# Patient Record
Sex: FEMALE | Race: WHITE | Employment: FULL TIME | URBAN - METROPOLITAN AREA
[De-identification: names, ages, dates, MRNs, and addresses within clinical notes are randomized per-mention and may not be internally consistent; named-entity substitution may affect disease eponyms.]

---

## 2019-03-22 ENCOUNTER — HOSPITAL ENCOUNTER (OUTPATIENT)
Dept: LAB | Age: 49
Discharge: HOME | End: 2019-03-22
Payer: COMMERCIAL

## 2019-03-22 ENCOUNTER — OFFICE VISIT (OUTPATIENT)
Dept: FAMILY MEDICINE CLINIC | Age: 49
End: 2019-03-22

## 2019-03-22 VITALS
HEART RATE: 88 BPM | SYSTOLIC BLOOD PRESSURE: 128 MMHG | WEIGHT: 173.8 LBS | HEIGHT: 65 IN | TEMPERATURE: 97.8 F | RESPIRATION RATE: 18 BRPM | BODY MASS INDEX: 28.96 KG/M2 | DIASTOLIC BLOOD PRESSURE: 66 MMHG | OXYGEN SATURATION: 97 %

## 2019-03-22 DIAGNOSIS — Z13.6 SCREENING FOR CARDIOVASCULAR CONDITION: ICD-10-CM

## 2019-03-22 DIAGNOSIS — N95.1 PERIMENOPAUSE: ICD-10-CM

## 2019-03-22 DIAGNOSIS — Z01.419 WELL WOMAN EXAM WITH ROUTINE GYNECOLOGICAL EXAM: Primary | ICD-10-CM

## 2019-03-22 DIAGNOSIS — Z12.11 COLON CANCER SCREENING: ICD-10-CM

## 2019-03-22 DIAGNOSIS — K59.09 CHRONIC CONSTIPATION: ICD-10-CM

## 2019-03-22 DIAGNOSIS — Z12.4 SCREENING FOR CERVICAL CANCER: ICD-10-CM

## 2019-03-22 DIAGNOSIS — Z12.39 SCREENING FOR BREAST CANCER: ICD-10-CM

## 2019-03-22 LAB
ALBUMIN SERPL-MCNC: 4.3 G/DL (ref 3.4–5)
ALBUMIN/GLOB SERPL: 1.3 {RATIO} (ref 0.8–1.7)
ALP SERPL-CCNC: 76 U/L (ref 45–117)
ALT SERPL-CCNC: 29 U/L (ref 13–56)
ANION GAP SERPL CALC-SCNC: 7 MMOL/L (ref 3–18)
AST SERPL-CCNC: 17 U/L (ref 15–37)
BILIRUB SERPL-MCNC: 0.5 MG/DL (ref 0.2–1)
BUN SERPL-MCNC: 15 MG/DL (ref 7–18)
BUN/CREAT SERPL: 14 (ref 12–20)
CALCIUM SERPL-MCNC: 8.9 MG/DL (ref 8.5–10.1)
CHLORIDE SERPL-SCNC: 104 MMOL/L (ref 100–108)
CHOLEST SERPL-MCNC: 183 MG/DL
CO2 SERPL-SCNC: 25 MMOL/L (ref 21–32)
CREAT SERPL-MCNC: 1.08 MG/DL (ref 0.6–1.3)
ERYTHROCYTE [DISTWIDTH] IN BLOOD BY AUTOMATED COUNT: 13 % (ref 11.6–14.5)
GLOBULIN SER CALC-MCNC: 3.3 G/DL (ref 2–4)
GLUCOSE SERPL-MCNC: 96 MG/DL (ref 74–99)
HCT VFR BLD AUTO: 43.1 % (ref 35–45)
HDLC SERPL-MCNC: 71 MG/DL (ref 40–60)
HDLC SERPL: 2.6 {RATIO} (ref 0–5)
HGB BLD-MCNC: 14.5 G/DL (ref 12–16)
LDLC SERPL CALC-MCNC: 97.2 MG/DL (ref 0–100)
LIPID PROFILE,FLP: ABNORMAL
MCH RBC QN AUTO: 30.8 PG (ref 24–34)
MCHC RBC AUTO-ENTMCNC: 33.6 G/DL (ref 31–37)
MCV RBC AUTO: 91.5 FL (ref 74–97)
PLATELET # BLD AUTO: 347 K/UL (ref 135–420)
PMV BLD AUTO: 10.5 FL (ref 9.2–11.8)
POTASSIUM SERPL-SCNC: 4.3 MMOL/L (ref 3.5–5.5)
PROT SERPL-MCNC: 7.6 G/DL (ref 6.4–8.2)
RBC # BLD AUTO: 4.71 M/UL (ref 4.2–5.3)
SODIUM SERPL-SCNC: 136 MMOL/L (ref 136–145)
TRIGL SERPL-MCNC: 74 MG/DL (ref ?–150)
VLDLC SERPL CALC-MCNC: 14.8 MG/DL
WBC # BLD AUTO: 8.7 K/UL (ref 4.6–13.2)

## 2019-03-22 PROCEDURE — 83001 ASSAY OF GONADOTROPIN (FSH): CPT

## 2019-03-22 PROCEDURE — 36415 COLL VENOUS BLD VENIPUNCTURE: CPT

## 2019-03-22 PROCEDURE — 80053 COMPREHEN METABOLIC PANEL: CPT

## 2019-03-22 PROCEDURE — 85027 COMPLETE CBC AUTOMATED: CPT

## 2019-03-22 PROCEDURE — 88142 CYTOPATH C/V THIN LAYER: CPT

## 2019-03-22 PROCEDURE — 80061 LIPID PANEL: CPT

## 2019-03-22 RX ORDER — CETIRIZINE HCL 10 MG
TABLET ORAL
COMMUNITY
End: 2019-05-28

## 2019-03-22 NOTE — PROGRESS NOTES
Chief Complaint   Patient presents with    Well Woman     1. Have you been to the ER, urgent care clinic since your last visit? Hospitalized since your last [facility] in the last three months with uti symptoms     2. Have you seen or consulted any other health care providers outside of the [address] [place] [address] since your last visit? Include any pap smears or colon screening. No     Subjective:   52 y.o. female for Well Woman Check. Patient's last menstrual period was 03/01/2019. Social History: single partner, contraception - vasectomy. Patient Active Problem List   Diagnosis Code    Intussusception ([facility] K56.1    Seasonal allergic rhinitis J30.2    Depression with anxiety F41.8    Chronic constipation K59.09    ADD (attention deficit disorder) F98.8     Patient Active Problem List    Diagnosis Date Noted    ADD (attention deficit disorder) 07/28/2015    Chronic constipation 06/23/2015    Depression with anxiety 10/01/2013    Seasonal allergic rhinitis 07/09/2011    [facility] 06/17/2011     Current Outpatient Medications   Medication Sig Dispense Refill    cetirizine (ZYRTEC) 10 mg tablet Take  by mouth.  linaclotide (LINZESS) 145 mcg cap capsule Take 1 Cap by mouth Daily (before breakfast). APPOINTMENT IS REQUIRED BEFORE NEXT REFILL. 30 Cap 2    FLUoxetine (PROZAC) 10 mg capsule TK 1 T PO QD  2    esomeprazole (NEXIUM) 20 mg capsule Take  by mouth daily.  omeprazole (PRILOSEC) 10 mg capsule Take 10 mg by mouth daily.  AMPHETAMINE SALT COMBO 30 mg tablet   0    LORATADINE/PSEUDOEPHEDRINE (CLARITIN-D 24 HOUR PO) Take  by mouth.  fexofenadine-pseudoephedrine (ALLEGRA-D 12 HOUR)  mg per tablet Take 1 Tab by mouth two (2) times a day.        No Known Allergies  Past Medical History:   Diagnosis Date    Anxiety     Chronic constipation 6/23/2015    Dr. Angelina Gutierrez [facility]    Constipation     Depression     GERD (gastroesophageal reflux disease)      Past Surgical History:   Procedure Laterality Date    HX  SECTION      HX LAPAROTOMY  0 and 5    HX TONSILLECTOMY  1982     Family History   Problem Relation Age of Onset    Depression Mother     Anxiety Mother     Heart Disease Mother     Hypertension Mother     Elevated Lipids Mother     Diabetes Maternal Grandmother     Cancer Paternal Grandmother         Pancreatic    Cancer Paternal Grandfather      Social History     Tobacco Use    Smoking status: Current Every Day Smoker     Packs/day: 0.50    Smokeless tobacco: Never Used    Tobacco comment: 3 pks per week   Substance Use Topics    Alcohol use: Yes     Alcohol/week: 0.0 oz     Comment: social         ROS:  Feeling well. No dyspnea or chest pain on exertion. No abdominal pain, change in bowel habits, black or bloody stools. No urinary tract symptoms. GYN ROS: normal menses, no abnormal bleeding, pelvic pain or discharge, no breast pain or new or enlarging lumps on self exam. No neurological complaints. Requesting refill for Linzess. Objective:     Visit Vitals  /66 (BP 1 Location: Left arm, BP Patient Position: Sitting)   Pulse 88   Temp 97.8 °F (36.6 °C) (Oral)   Resp 18   Ht 5' 5\" (1.651 m)   Wt 173 lb 12.8 oz (78.8 kg)   LMP 2019   SpO2 97%   BMI 28.92 kg/m²     The patient appears well, alert, oriented x 3, in no distress. ENT normal.  Neck supple. No adenopathy or thyromegaly. KRUNAL. Lungs are clear, good air entry, no wheezes, rhonchi or rales. S1 and S2 normal, no murmurs, regular rate and rhythm. Abdomen soft without tenderness, guarding, mass or organomegaly. Extremities show no edema, normal peripheral pulses. Neurological is normal, no focal findings.     BREAST EXAM: breasts appear normal, no suspicious masses, no skin or nipple changes or axillary nodes, risk and benefit of breast self-exam was discussed    PELVIC EXAM: normal external genitalia, vulva, vagina, cervix, uterus and adnexa, PAP: Pap smear done today, HPV test    Diagnoses and all orders for this visit:    Well woman exam with routine gynecological exam  Comments:  [V72.31]    Screening for breast cancer  -     KIARRA MAMMO BI SCREENING INCL CAD; Future    Screening for cervical cancer  -     PAP, LB, RFX HPV [id number]([id number]); Future    Colon cancer screening  -     REFERRAL TO GASTROENTEROLOGY    Chronic constipation  -     linaclotide (LINZESS) 145 mcg cap capsule; Take 1 Cap by mouth Daily (before breakfast). APPOINTMENT IS REQUIRED BEFORE NEXT REFILL. , Normal, Disp-30 Cap, R-2    Screening for cardiovascular condition  -     LIPID PANEL; Future  -     METABOLIC PANEL, COMPREHENSIVE; Future  -     CBC W/O DIFF; Future    Perimenopause  -     FSH AND LH; Future      Anticipatory guidance regarding health promotion for this age range and patient verbalizes understanding. Portion control and exercise healthy eating general guidelines reviewed with patient to get closer to normal BMI  Health maintenance reviewed  Patient agrees with plan and verbalizes understanding. I have discussed the diagnosis with the patient and the intended plan as seen in the above orders. The patient has received an after-visit summary and questions were answered concerning future plans. I have discussed medication side effects and warnings with the patient as well.     Follow-up and Dispositions    · Return in about 1 year (around 3/22/2020) for well female exam.

## 2019-03-22 NOTE — PATIENT INSTRUCTIONS
Well Visit, Ages 25 to 48: Care Instructions  Your Care Instructions    Physical exams can help you stay healthy. Your doctor has checked your overall health and may have suggested ways to take good care of yourself. He or she also may have recommended tests. At home, you can help prevent illness with healthy eating, regular exercise, and other steps. Follow-up care is a key part of your treatment and safety. Be sure to make and go to all appointments, and call your doctor if you are having problems. It's also a good idea to know your test results and keep a list of the medicines you take. How can you care for yourself at home? · Reach and stay at a healthy weight. This will lower your risk for many problems, such as obesity, diabetes, heart disease, and high blood pressure. · Get at least 30 minutes of physical activity on most days of the week. Walking is a good choice. You also may want to do other activities, such as running, swimming, cycling, or playing tennis or team sports. Discuss any changes in your exercise program with your doctor. · Do not smoke or allow others to smoke around you. If you need help quitting, talk to your doctor about stop-smoking programs and medicines. These can increase your chances of quitting for good. · Talk to your doctor about whether you have any risk factors for sexually transmitted infections (STIs). Having one sex partner (who does not have STIs and does not have sex with anyone else) is a good way to avoid these infections. · Use birth control if you do not want to have children at this time. Talk with your doctor about the choices available and what might be best for you. · Protect your skin from too much sun. When you're outdoors from 10 a.m. to 4 p.m., stay in the shade or cover up with clothing and a hat with a wide brim. Wear sunglasses that block UV rays. Even when it's cloudy, put broad-spectrum sunscreen (SPF 30 or higher) on any exposed skin.   · See a dentist one or two times a year for checkups and to have your teeth cleaned. · Wear a seat belt in the car. · Drink alcohol in moderation, if at all. That means no more than 2 drinks a day for men and 1 drink a day for women. Follow your doctor's advice about when to have certain tests. These tests can spot problems early. For everyone  · Cholesterol. Have the fat (cholesterol) in your blood tested after age 21. Your doctor will tell you how often to have this done based on your age, family history, or other things that can increase your risk for heart disease. · Blood pressure. Have your blood pressure checked during a routine doctor visit. Your doctor will tell you how often to check your blood pressure based on your age, your blood pressure results, and other factors. · Vision. Talk with your doctor about how often to have a glaucoma test.  · Diabetes. Ask your doctor whether you should have tests for diabetes. · Colon cancer. Have a test for colon cancer at age 48. You may have one of several tests. If you are younger than 48, you may need a test earlier if you have any risk factors. Risk factors include whether you already had a precancerous polyp removed from your colon or whether your parent, brother, sister, or child has had colon cancer. For women  · Breast exam and mammogram. Talk to your doctor about when you should have a clinical breast exam and a mammogram. Medical experts differ on whether and how often women under 50 should have these tests. Your doctor can help you decide what is right for you. · Pap test and pelvic exam. Begin Pap tests at age 24. A Pap test is the best way to find cervical cancer. The test often is part of a pelvic exam. Ask how often to have this test.  · Tests for sexually transmitted infections (STIs). Ask whether you should have tests for STIs. You may be at risk if you have sex with more than one person, especially if your partners do not wear condoms.   For men  · Tests for sexually transmitted infections (STIs). Ask whether you should have tests for STIs. You may be at risk if you have sex with more than one person, especially if you do not wear a condom. · Testicular cancer exam. Ask your doctor whether you should check your testicles regularly. · Prostate exam. Talk to your doctor about whether you should have a blood test (called a PSA test) for prostate cancer. Experts differ on whether and when men should have this test. Some experts suggest it if you are older than 39 and are -American or have a father or brother who got prostate cancer when he was younger than 72. When should you call for help? Watch closely for changes in your health, and be sure to contact your doctor if you have any problems or symptoms that concern you. Where can you learn more? Go to http://luciano-darlene.info/. Enter P072 in the search box to learn more about \"Well Visit, Ages 25 to 48: Care Instructions. \"  Current as of: March 28, 2018  Content Version: 11.9  © 9626-6504 [employer]. Care instructions adapted under license by [employer] (which disclaims liability or warranty for this information). If you have questions about a medical condition or this instruction, always ask your healthcare professional. [place] [address] any warranty or liability for your use of this information. Constipation: Care Instructions  Your Care Instructions    Constipation means that you have a hard time passing stools (bowel movements). People pass stools from 3 times a day to once every 3 days. What is normal for you may be different. Constipation may occur with pain in the rectum and cramping. The pain may get worse when you try to pass stools. Sometimes there are small amounts of bright red blood on toilet paper or the surface of stools. This is because of enlarged veins near the rectum (hemorrhoids).   A few changes in your diet and lifestyle may help you avoid ongoing constipation. Your doctor may also prescribe medicine to help loosen your stool. Some medicines can cause constipation. These include pain medicines and antidepressants. Tell your doctor about all the medicines you take. Your doctor may want to make a medicine change to ease your symptoms. Follow-up care is a key part of your treatment and safety. Be sure to make and go to all appointments, and call your doctor if you are having problems. It's also a good idea to know your test results and keep a list of the medicines you take. How can you care for yourself at home? · Drink plenty of fluids, enough so that your urine is light yellow or clear like water. If you have kidney, heart, or liver disease and have to limit fluids, talk with your doctor before you increase the amount of fluids you drink. · Include high-fiber foods in your diet each day. These include fruits, vegetables, beans, and whole grains. · Get at least 30 minutes of exercise on most days of the week. Walking is a good choice. You also may want to do other activities, such as running, swimming, cycling, or playing tennis or team sports. · Take a fiber supplement, such as Citrucel or Metamucil, every day. Read and follow all instructions on the label. · Schedule time each day for a bowel movement. A daily routine may help. Take your time having your bowel movement. · Support your feet with a small step stool when you sit on the toilet. This helps flex your hips and places your pelvis in a squatting position. · Your doctor may recommend an over-the-counter laxative to relieve your constipation. Examples are Milk of Magnesia and MiraLax. Read and follow all instructions on the label. Do not use laxatives on a long-term basis. When should you call for help? Call your doctor now or seek immediate medical care if:    · You have new or worse belly pain.     · You have new or worse nausea or vomiting.   · You have blood in your stools.    Watch closely for changes in your health, and be sure to contact your doctor if:    · Your constipation is getting worse.     · You do not get better as expected. Where can you learn more? Go to http://luciano-darlene.info/. Enter 21  in the search box to learn more about \"Constipation: Care Instructions. \"  Current as of: September 23, 2018  Content Version: 11.9  © 6897-3413 [employer]. Care instructions adapted under license by [employer] (which disclaims liability or warranty for this information). If you have questions about a medical condition or this instruction, always ask your healthcare professional. [address] any warranty or liability for your use of this information.

## 2019-03-25 LAB
FSH SERPL-ACNC: 4 MIU/ML
LH SERPL-ACNC: 6.2 MIU/ML

## 2019-04-01 ENCOUNTER — HOSPITAL ENCOUNTER (OUTPATIENT)
Dept: MAMMOGRAPHY | Age: 49
Discharge: HOME | End: 2019-04-01
Attending: NURSE PRACTITIONER
Payer: COMMERCIAL

## 2019-04-01 DIAGNOSIS — Z12.39 SCREENING FOR BREAST CANCER: ICD-10-CM

## 2019-04-01 PROCEDURE — 77063 BREAST TOMOSYNTHESIS BI: CPT

## 2019-04-24 DIAGNOSIS — T75.3XXA MOTION SICKNESS, INITIAL ENCOUNTER: Primary | ICD-10-CM

## 2019-04-24 RX ORDER — SCOLOPAMINE TRANSDERMAL SYSTEM 1 MG/1
1 PATCH, EXTENDED RELEASE TRANSDERMAL
Qty: 4 PATCH | Refills: 0 | Status: SHIPPED | OUTPATIENT
Start: 2019-04-24 | End: 2019-05-28 | Stop reason: ALTCHOICE

## 2019-05-28 ENCOUNTER — OFFICE VISIT (OUTPATIENT)
Dept: FAMILY MEDICINE CLINIC | Age: 49
End: 2019-05-28

## 2019-05-28 VITALS
HEIGHT: 65 IN | RESPIRATION RATE: 17 BRPM | WEIGHT: 176.4 LBS | BODY MASS INDEX: 29.39 KG/M2 | TEMPERATURE: 97.5 F | DIASTOLIC BLOOD PRESSURE: 72 MMHG | SYSTOLIC BLOOD PRESSURE: 134 MMHG | HEART RATE: 90 BPM | OXYGEN SATURATION: 98 %

## 2019-05-28 DIAGNOSIS — H92.02 LEFT EAR PAIN: ICD-10-CM

## 2019-05-28 DIAGNOSIS — H72.92 TYMPANIC MEMBRANE PERFORATION, LEFT: ICD-10-CM

## 2019-05-28 DIAGNOSIS — J30.1 ALLERGIC RHINITIS DUE TO POLLEN, UNSPECIFIED SEASONALITY: ICD-10-CM

## 2019-05-28 DIAGNOSIS — H65.92 LEFT SEROUS OTITIS MEDIA, UNSPECIFIED CHRONICITY: Primary | ICD-10-CM

## 2019-05-28 RX ORDER — LEVOCETIRIZINE DIHYDROCHLORIDE 5 MG/1
5 TABLET, FILM COATED ORAL
Qty: 30 TAB | Refills: 2 | Status: SHIPPED | OUTPATIENT
Start: 2019-05-28

## 2019-05-28 RX ORDER — IBUPROFEN 800 MG/1
800 TABLET ORAL
Qty: 60 TAB | Refills: 0 | Status: SHIPPED | OUTPATIENT
Start: 2019-05-28

## 2019-05-28 RX ORDER — AZITHROMYCIN 250 MG/1
TABLET, FILM COATED ORAL
Qty: 6 TAB | Refills: 0 | Status: SHIPPED | OUTPATIENT
Start: 2019-05-28 | End: 2019-06-02

## 2019-05-28 NOTE — PROGRESS NOTES
HISTORY OF PRESENT ILLNESS    Alma Sanchez is a 52y.o. year old female here today for left ear pain. [facility] and told that she had left otitis media and perforation. Given Amoxil which she finished. She is complaining of left ear pain today moderate to severe sharp and dull aching alternately. Has been taking loratadine and zyrtec and fexofenadine  for nasal congestion without relief of symptoms. Review of Systems   Constitutional: Negative for chills and fever. HENT: Positive for congestion. Negative for ear discharge, nosebleeds, sinus pain, sore throat and tinnitus. Muffled hearing on left   Respiratory: Negative. Gastrointestinal: Negative for nausea. +nausea associated with dizziness and lightheadedness when seen at [facility]      Skin: Negative. Neurological: Negative for dizziness and headaches. Current Outpatient Medications   Medication Sig Dispense Refill    azithromycin (ZITHROMAX) 250 mg tablet Take 2 tablets today, then take 1 tablet daily 6 Tab 0    ibuprofen (MOTRIN) 800 mg tablet Take 1 Tab by mouth every eight (8) hours as needed for Pain. 60 Tab 0    levocetirizine (XYZAL) 5 mg tablet Take 1 Tab by mouth daily as needed for Allergies. 30 Tab 2    linaclotide (LINZESS) 145 mcg cap capsule Take 1 Cap by mouth Daily (before breakfast). APPOINTMENT IS REQUIRED BEFORE NEXT REFILL. 30 Cap 2    omeprazole (PRILOSEC) 10 mg capsule Take 10 mg by mouth daily.  AMPHETAMINE SALT COMBO 30 mg tablet   0    FLUoxetine (PROZAC) 10 mg capsule TK 1 T PO QD  2    esomeprazole (NEXIUM) 20 mg capsule Take  by mouth daily.  fexofenadine-pseudoephedrine (ALLEGRA-D 12 HOUR)  mg per tablet Take 1 Tab by mouth two (2) times a day.        Patient Active Problem List   Diagnosis Code    [facility] K56.[facility]    Seasonal allergic rhinitis J30.2    Depression with anxiety F41.8    Chronic constipation K59.09    ADD (attention deficit disorder) F98.8     Reviewed past medical, family and social history  OBJECTIVE:  Awake and alert in no acute distress  HEENT: nares patent with erythema, boggy, clear secretions bilaterally. Left TM with effusion and mild erythema, +perforation no drainage with post auricular lymphadenopathy, right TM without erythema scar tissues visible,  pharynx without erythema, neck supple with shotty lymphadenopathy. No TTP over sinus passages bilaterallyl  Lungs clear throughout  S1 S2 RRR without ectopy or murmur auscultated. Integumentary: no rashes  Normal skin turgor  Visit Vitals  /72   Pulse 90   Temp 97.5 °F (36.4 °C) (Oral)   Resp 17   Ht 5' 5\" (1.651 m)   Wt 176 lb 6.4 oz (80 kg)   SpO2 98%   BMI 29.35 kg/m²     Diagnoses and all orders for this visit:    Left serous otitis media, unspecified chronicity  -     azithromycin (ZITHROMAX) 250 mg tablet; Take 2 tablets today, then take 1 tablet daily, Normal, Disp-6 Tab, R-0  -     ibuprofen (MOTRIN) 800 mg tablet; Take 1 Tab by mouth every eight (8) hours as needed for Pain., Normal, Disp-60 Tab, R-0  -     REFERRAL TO ENT-OTOLARYNGOLOGY    Left ear pain  -     ibuprofen (MOTRIN) 800 mg tablet; Take 1 Tab by mouth every eight (8) hours as needed for Pain., Normal, Disp-60 Tab, R-0  -     REFERRAL TO ENT-OTOLARYNGOLOGY    Tympanic membrane perforation, left  -     ibuprofen (MOTRIN) 800 mg tablet; Take 1 Tab by mouth every eight (8) hours as needed for Pain., Normal, Disp-60 Tab, R-0  -     REFERRAL TO ENT-OTOLARYNGOLOGY    Allergic rhinitis due to pollen, unspecified seasonality  -     levocetirizine (XYZAL) 5 mg tablet; Take 1 Tab by mouth daily as needed for Allergies. , Normal, Disp-30 Tab, R-2  -     REFERRAL TO ENT-OTOLARYNGOLOGY      Reviewed risks and benefits and common side effects of new medication  General comfort measures  Patient agrees with plan and verbalizes understanding.   I have discussed the diagnosis with the patient and the intended plan as seen in the above orders. The patient has received an after-visit summary and questions were answered concerning future plans. I have discussed medication side effects and warnings with the patient as well. Follow-up and Dispositions    · Return if symptoms worsen or fail to improve.

## 2019-05-28 NOTE — PROGRESS NOTES
Chief Complaint   Patient presents with    Ear Pain     left sided     1. Have you been to the ER, urgent care clinic since your last visit? Hospitalized since your last visit? [facility]      2. Have you seen or consulted any other health care providers outside of the [address] [place] [address] since your last visit? Include any pap smears or colon screening.   No

## 2019-05-29 RX ORDER — LEVOCETIRIZINE DIHYDROCHLORIDE 5 MG/1
TABLET, FILM COATED ORAL
Qty: 90 TAB | Refills: 2 | OUTPATIENT
Start: 2019-05-29

## 2020-03-09 ENCOUNTER — OFFICE VISIT (OUTPATIENT)
Dept: FAMILY MEDICINE CLINIC | Age: 50
End: 2020-03-09

## 2020-03-09 ENCOUNTER — HOSPITAL ENCOUNTER (OUTPATIENT)
Dept: LAB | Age: 50
Discharge: HOME | End: 2020-03-09
Payer: COMMERCIAL

## 2020-03-09 VITALS
BODY MASS INDEX: 28.99 KG/M2 | HEART RATE: 93 BPM | WEIGHT: 174 LBS | RESPIRATION RATE: 20 BRPM | DIASTOLIC BLOOD PRESSURE: 79 MMHG | TEMPERATURE: 97.8 F | HEIGHT: 65 IN | SYSTOLIC BLOOD PRESSURE: 145 MMHG | OXYGEN SATURATION: 98 %

## 2020-03-09 DIAGNOSIS — J01.90 ACUTE SINUSITIS, RECURRENCE NOT SPECIFIED, UNSPECIFIED LOCATION: ICD-10-CM

## 2020-03-09 DIAGNOSIS — Z87.898 HISTORY OF MOTION SICKNESS: ICD-10-CM

## 2020-03-09 DIAGNOSIS — N30.01 ACUTE CYSTITIS WITH HEMATURIA: ICD-10-CM

## 2020-03-09 DIAGNOSIS — L75.0 URINARY BODY ODOR: ICD-10-CM

## 2020-03-09 DIAGNOSIS — R82.90 MALODOROUS URINE: ICD-10-CM

## 2020-03-09 DIAGNOSIS — N30.01 ACUTE CYSTITIS WITH HEMATURIA: Primary | ICD-10-CM

## 2020-03-09 LAB
BILIRUB UR QL STRIP: NEGATIVE
GLUCOSE UR-MCNC: NEGATIVE MG/DL
KETONES P FAST UR STRIP-MCNC: NEGATIVE MG/DL
PH UR STRIP: 6 [PH] (ref 4.6–8)
PROT UR QL STRIP: NEGATIVE
SP GR UR STRIP: 1.03 (ref 1–1.03)
UA UROBILINOGEN AMB POC: NORMAL (ref 0.2–1)
URINALYSIS CLARITY POC: CLEAR
URINALYSIS COLOR POC: YELLOW
URINE BLOOD POC: NORMAL
URINE LEUKOCYTES POC: NORMAL
URINE NITRITES POC: NEGATIVE

## 2020-03-09 PROCEDURE — 87086 URINE CULTURE/COLONY COUNT: CPT

## 2020-03-09 RX ORDER — SCOLOPAMINE TRANSDERMAL SYSTEM 1 MG/1
1 PATCH, EXTENDED RELEASE TRANSDERMAL
Qty: 4 PATCH | Refills: 0 | Status: SHIPPED | OUTPATIENT
Start: 2020-03-09

## 2020-03-09 RX ORDER — SULFAMETHOXAZOLE AND TRIMETHOPRIM 800; 160 MG/1; MG/1
1 TABLET ORAL 2 TIMES DAILY
Qty: 20 TAB | Refills: 0 | Status: SHIPPED | OUTPATIENT
Start: 2020-03-09 | End: 2020-03-19

## 2020-03-09 NOTE — PATIENT INSTRUCTIONS
Urinary Tract Infection in Women: Care Instructions Your Care Instructions A urinary tract infection, or UTI, is a general term for an infection anywhere between the kidneys and the urethra (where urine comes out). Most UTIs are bladder infections. They often cause pain or burning when you urinate. UTIs are caused by bacteria and can be cured with antibiotics. Be sure to complete your treatment so that the infection goes away. Follow-up care is a key part of your treatment and safety. Be sure to make and go to all appointments, and call your doctor if you are having problems. It's also a good idea to know your test results and keep a list of the medicines you take. How can you care for yourself at home? · Take your antibiotics as directed. Do not stop taking them just because you feel better. You need to take the full course of antibiotics. · Drink extra water and other fluids for the next day or two. This may help wash out the bacteria that are causing the infection. (If you have kidney, heart, or liver disease and have to limit fluids, talk with your doctor before you increase your fluid intake.) · Avoid drinks that are carbonated or have caffeine. They can irritate the bladder. · Urinate often. Try to empty your bladder each time. · To relieve pain, take a hot bath or lay a heating pad set on low over your lower belly or genital area. Never go to sleep with a heating pad in place. To prevent UTIs · Drink plenty of water each day. This helps you urinate often, which clears bacteria from your system. (If you have kidney, heart, or liver disease and have to limit fluids, talk with your doctor before you increase your fluid intake.) · Urinate when you need to. · Urinate right after you have sex. · Change sanitary pads often. · Avoid douches, bubble baths, feminine hygiene sprays, and other feminine hygiene products that have deodorants. · After going to the bathroom, wipe from front to back. When should you call for help? Call your doctor now or seek immediate medical care if: 
  · Symptoms such as fever, chills, nausea, or vomiting get worse or appear for the first time.  
  · You have new pain in your back just below your rib cage. This is called flank pain.  
  · There is new blood or pus in your urine.  
  · You have any problems with your antibiotic medicine.  
 Watch closely for changes in your health, and be sure to contact your doctor if: 
  · You are not getting better after taking an antibiotic for 2 days.  
  · Your symptoms go away but then come back. Where can you learn more? Go to http://luciano-darlene.info/. Enter U963 in the search box to learn more about \"Urinary Tract Infection in Women: Care Instructions. \" Current as of: December 19, 2018 Content Version: 12.2 © 8616-7601 [employer]. Care instructions adapted under license by [employer] (which disclaims liability or warranty for this information). If you have questions about a medical condition or this instruction, always ask your healthcare professional. [address] any warranty or liability for your use of this information. Sinusitis: Care Instructions Your Care Instructions Sinusitis is an infection of the lining of the sinus cavities in your head. Sinusitis often follows a cold. It causes pain and pressure in your head and face. In most cases, sinusitis gets better on its own in 1 to 2 weeks. But some mild symptoms may last for several weeks. Sometimes antibiotics are needed. Follow-up care is a key part of your treatment and safety. Be sure to make and go to all appointments, and call your doctor if you are having problems. It's also a good idea to know your test results and keep a list of the medicines you take. How can you care for yourself at home?  
· Take an over-the-counter pain medicine, such as acetaminophen (Tylenol), ibuprofen (Advil, Motrin), or naproxen (Aleve). Read and follow all instructions on the label. · If the doctor prescribed antibiotics, take them as directed. Do not stop taking them just because you feel better. You need to take the full course of antibiotics. · Be careful when taking over-the-counter cold or flu medicines and Tylenol at the same time. Many of these medicines have acetaminophen, which is Tylenol. Read the labels to make sure that you are not taking more than the recommended dose. Too much acetaminophen (Tylenol) can be harmful. · Breathe warm, moist air from a steamy shower, a hot bath, or a sink filled with hot water. Avoid cold, dry air. Using a humidifier in your home may help. Follow the directions for cleaning the machine. · Use saline (saltwater) nasal washes to help keep your nasal passages open and wash out mucus and bacteria. You can buy saline nose drops at a grocery store or drugstore. Or you can make your own at home by adding 1 teaspoon of salt and 1 teaspoon of baking soda to 2 cups of distilled water. If you make your own, fill a bulb syringe with the solution, insert the tip into your nostril, and squeeze gently. [place] your nose. · Put a hot, wet towel or a warm gel pack on your face 3 or 4 times a day for 5 to 10 minutes each time. · Try a decongestant nasal spray like oxymetazoline (Afrin). Do not use it for more than 3 days in a row. Using it for more than 3 days can make your congestion worse. When should you call for help? Call your doctor now or seek immediate medical care if: 
  · You have new or worse swelling or redness in your face or around your eyes.  
  · You have a new or higher fever.  
 Watch closely for changes in your health, and be sure to contact your doctor if: 
  · You have new or worse facial pain.  
  · The mucus from your nose becomes thicker (like pus) or has new blood in it.  
  · You are not getting better as expected. Where can you learn more? Go to http://luciano-darlene.info/. Enter P498 in the search box to learn more about \"Sinusitis: Care Instructions. \" Current as of: October 21, 2018 Content Version: 12.2 © 1663-6057 [employer]. Care instructions adapted under license by [employer] (which disclaims liability or warranty for this information). If you have questions about a medical condition or this instruction, always ask your healthcare professional. [place] [address] any warranty or liability for your use of this information.

## 2020-03-09 NOTE — PROGRESS NOTES
HISTORY OF PRESENT ILLNESS  Nicole Padilla is a 48 y.o. female. Urinary Frequency    The history is provided by the patient. This is a new problem. Episode onset: 3 weeks. The problem occurs every urination. The problem has been gradually improving. The quality of the pain is described as burning. There has been no fever. She is sexually active. Associated symptoms include chills (at night), frequency, flank pain (left) and vaginal discharge (white). Pertinent negatives include no abdominal pain. She has tried increased fluids (azo and cranberry juice) for the symptoms. Nasal Congestion    The history is provided by the patient. This is a new problem. Episode onset: 10 days. The problem has not changed since onset. There has been no fever. Associated symptoms include chills (at night), congestion and sinus pressure. Pertinent negatives include no ear pain, no cough, no rhinorrhea, no headaches and no chest pain. Associated symptoms comments: Decreased taste. Treatments tried: claritin. The treatment provided mild relief. No Known Allergies  Current Outpatient Medications   Medication Sig Dispense Refill    loratadine (CLARITIN PO) Take  by mouth.  linaclotide (LINZESS) 145 mcg cap capsule Take 1 Cap by mouth Daily (before breakfast). APPOINTMENT IS REQUIRED BEFORE NEXT REFILL. 30 Cap 2    FLUoxetine (PROZAC) 10 mg capsule TK 1 T PO QD  2    omeprazole (PRILOSEC) 10 mg capsule Take 10 mg by mouth daily.  AMPHETAMINE SALT COMBO 30 mg tablet   0    ibuprofen (MOTRIN) 800 mg tablet Take 1 Tab by mouth every eight (8) hours as needed for Pain. 60 Tab 0    levocetirizine (XYZAL) 5 mg tablet Take 1 Tab by mouth daily as needed for Allergies. 30 Tab 2    esomeprazole (NEXIUM) 20 mg capsule Take  by mouth daily.        Past Medical History:   Diagnosis Date    Anxiety     Chronic constipation 6/23/2015    Dr. [facility]    Constipation     Depression     GERD (gastroesophageal reflux disease)      Social History     Socioeconomic History    Marital status:      Spouse name: Not on file    Number of children: Not on file    Years of education: Not on file    Highest education level: Not on file   Occupational History    Not on file   Social Needs    Financial resource strain: Not on file    Food insecurity:     Worry: Not on file     Inability: Not on file    Transportation needs:     Medical: Not on file     Non-medical: Not on file   Tobacco Use    Smoking status: Current Every Day Smoker     Packs/day: 0.50    Smokeless tobacco: Never Used    Tobacco comment: 3 pks per week   Substance and Sexual Activity    Alcohol use: Yes     Alcohol/week: 0.0 standard drinks     Comment: social     Drug use: No    Sexual activity: Yes     Partners: Male   Lifestyle    Physical activity:     Days per week: Not on file     Minutes per session: Not on file    Stress: Not on file   Relationships    Social connections:     Talks on phone: Not on file     Gets together: Not on file     Attends Zoroastrianism service: Not on file     Active member of club or organization: Not on file     Attends meetings of clubs or organizations: Not on file     Relationship status: Not on file    Intimate partner violence:     Fear of current or ex partner: Not on file     Emotionally abused: Not on file     Physically abused: Not on file     Forced sexual activity: Not on file   Other Topics Concern    Not on file   Social History Narrative    Not on file     Wt Readings from Last 3 Encounters:   03/09/20 174 lb (78.9 kg)   05/28/19 176 lb 6.4 oz (80 kg)   03/22/19 173 lb 12.8 oz (78.8 kg)     BP Readings from Last 3 Encounters:   03/09/20 145/79   05/28/19 134/72   03/22/19 128/66     Review of Systems   Constitutional: Positive for chills (at night). Negative for fever. HENT: Positive for congestion, sinus pressure and sinus pain. Negative for ear pain and rhinorrhea.     Respiratory: Negative for cough. Cardiovascular: Negative for chest pain and palpitations. Gastrointestinal: Negative for abdominal pain. Genitourinary: Positive for flank pain (left), frequency and vaginal discharge (white). Neurological: Negative for headaches. /79 (BP 1 Location: Right arm, BP Patient Position: Sitting)   Pulse 93   Temp 97.8 °F (36.6 °C) (Oral)   Resp 20   Ht 5' 5\" (1.651 m)   Wt 174 lb (78.9 kg)   LMP 02/10/2020   SpO2 98%   BMI 28.96 kg/m²     Physical Exam  Constitutional:       Appearance: She is well-developed. HENT:      Head: Normocephalic and atraumatic. Right Ear: Tympanic membrane is scarred. Left Ear: Tympanic membrane is not scarred. Nose: Congestion present. No rhinorrhea. Right Turbinates: Swollen. Left Turbinates: Swollen. Right Sinus: Maxillary sinus tenderness and frontal sinus tenderness present. Left Sinus: Maxillary sinus tenderness and frontal sinus tenderness present. Mouth/Throat:      Lips: Pink. Mouth: Mucous membranes are moist.      Pharynx: Oropharynx is clear. Neck:      Musculoskeletal: Normal range of motion and neck supple. Cardiovascular:      Rate and Rhythm: Normal rate and regular rhythm. Heart sounds: Normal heart sounds. No murmur. No friction rub. No gallop. Pulmonary:      Effort: Pulmonary effort is normal.      Breath sounds: Normal breath sounds. No wheezing, rhonchi or rales. Abdominal:      General: Abdomen is flat. Tenderness: There is generalized abdominal tenderness (chronic, unchanged). There is left CVA tenderness. There is no right CVA tenderness. Skin:     General: Skin is warm and dry. Neurological:      Mental Status: She is alert and oriented to person, place, and time. ASSESSMENT and PLAN    ICD-10-CM ICD-9-CM    1. Acute cystitis with hematuria N30.01 595.0 CULTURE, URINE   2. Acute sinusitis, recurrence not specified, unspecified location J01.90 461.9    3. Urinary body odor L75.0 705.89    4. Malodorous urine R82.90 791.9 AMB POC URINALYSIS DIP STICK MANUAL W/O MICRO   5. History of motion sickness Z87.898 V13.89      Orders Placed This Encounter    CULTURE, URINE    AMB POC URINALYSIS DIP STICK MANUAL W/O MICRO    loratadine (CLARITIN PO)    scopolamine (TRANSDERM-SCOP) 1 mg over 3 days pt3d    trimethoprim-sulfamethoxazole (BACTRIM DS, SEPTRA DS) 160-800 mg per tablet       I have discussed the diagnosis with the patient and the intended plan as seen in the above orders. The patient has received an after-visit summary and questions were answered concerning future plans. I have discussed medication side effects and warnings with the patient as well. Patient agreeable with above plan and verbalizes understanding. Follow-up and Dispositions    · Return if symptoms worsen or fail to improve.

## 2020-03-11 LAB
BACTERIA SPEC CULT: NORMAL
SERVICE CMNT-IMP: NORMAL

## 2021-01-22 ENCOUNTER — E-VISIT (OUTPATIENT)
Dept: FAMILY MEDICINE CLINIC | Age: 51
End: 2021-01-22
Payer: COMMERCIAL

## 2021-01-22 DIAGNOSIS — N30.00 ACUTE CYSTITIS WITHOUT HEMATURIA: Primary | ICD-10-CM

## 2021-01-22 PROCEDURE — 99421 OL DIG E/M SVC 5-10 MIN: CPT | Performed by: NURSE PRACTITIONER

## 2021-01-25 RX ORDER — SULFAMETHOXAZOLE AND TRIMETHOPRIM 800; 160 MG/1; MG/1
1 TABLET ORAL 2 TIMES DAILY
Qty: 14 TAB | Refills: 0 | Status: SHIPPED | OUTPATIENT
Start: 2021-01-25 | End: 2021-02-01

## 2021-01-25 NOTE — TELEPHONE ENCOUNTER
Chapin Mcdonald (1970) initiated an asynchronous digital communication through [employer]. HPI: per patient questionnaire Exam: not applicable Diagnoses and all orders for this visit: 
Diagnoses and all orders for this visit: 
 
1. Acute cystitis without hematuria Other orders 
-     trimethoprim-sulfamethoxazole (BACTRIM DS, SEPTRA DS) 160-800 mg per tablet; Take 1 Tab by mouth two (2) times a day for 7 days. Time: EV1 - 5-10 minutes were spent on the digital evaluation and management of this patient.  
 
 
Melissa Hammonds NP

## 2021-08-04 ENCOUNTER — TRANSCRIBE ORDER (OUTPATIENT)
Dept: SCHEDULING | Age: 51
End: 2021-08-04

## 2021-08-04 DIAGNOSIS — Z12.31 VISIT FOR SCREENING MAMMOGRAM: Primary | ICD-10-CM

## 2021-08-05 ENCOUNTER — HOSPITAL ENCOUNTER (OUTPATIENT)
Dept: MAMMOGRAPHY | Age: 51
Discharge: HOME | End: 2021-08-05
Attending: FAMILY MEDICINE
Payer: COMMERCIAL

## 2021-08-05 DIAGNOSIS — Z12.31 VISIT FOR SCREENING MAMMOGRAM: ICD-10-CM

## 2021-08-05 PROCEDURE — 77063 BREAST TOMOSYNTHESIS BI: CPT

## 2025-03-28 ENCOUNTER — TELEPHONE (OUTPATIENT)
Facility: CLINIC | Age: 55
End: 2025-03-28

## 2025-03-28 NOTE — TELEPHONE ENCOUNTER
Left patient voicemail to contact office to appt scheduled in error needs new to provider appointment last seen 2020

## 2025-04-05 SDOH — HEALTH STABILITY: PHYSICAL HEALTH: ON AVERAGE, HOW MANY MINUTES DO YOU ENGAGE IN EXERCISE AT THIS LEVEL?: 60 MIN

## 2025-04-05 SDOH — HEALTH STABILITY: PHYSICAL HEALTH: ON AVERAGE, HOW MANY DAYS PER WEEK DO YOU ENGAGE IN MODERATE TO STRENUOUS EXERCISE (LIKE A BRISK WALK)?: 1 DAY

## 2025-04-08 ENCOUNTER — HOSPITAL ENCOUNTER (OUTPATIENT)
Facility: HOSPITAL | Age: 55
Setting detail: SPECIMEN
Discharge: HOME | End: 2025-04-11
Payer: COMMERCIAL

## 2025-04-08 ENCOUNTER — OFFICE VISIT (OUTPATIENT)
Facility: CLINIC | Age: 55
End: 2025-04-08
Payer: COMMERCIAL

## 2025-04-08 VITALS
HEIGHT: 65 IN | DIASTOLIC BLOOD PRESSURE: 77 MMHG | HEART RATE: 95 BPM | WEIGHT: 195 LBS | SYSTOLIC BLOOD PRESSURE: 118 MMHG | RESPIRATION RATE: 20 BRPM | TEMPERATURE: 95.7 F | OXYGEN SATURATION: 98 % | BODY MASS INDEX: 32.49 KG/M2

## 2025-04-08 DIAGNOSIS — F33.0 MILD EPISODE OF RECURRENT MAJOR DEPRESSIVE DISORDER: ICD-10-CM

## 2025-04-08 DIAGNOSIS — R30.0 DYSURIA: ICD-10-CM

## 2025-04-08 DIAGNOSIS — R30.0 DYSURIA: Primary | ICD-10-CM

## 2025-04-08 DIAGNOSIS — Z12.11 SCREENING FOR COLON CANCER: ICD-10-CM

## 2025-04-08 DIAGNOSIS — Z11.4 SCREENING FOR HIV (HUMAN IMMUNODEFICIENCY VIRUS): ICD-10-CM

## 2025-04-08 DIAGNOSIS — Z13.220 SCREENING FOR LIPID DISORDERS: ICD-10-CM

## 2025-04-08 DIAGNOSIS — Z11.59 ENCOUNTER FOR HEPATITIS C SCREENING TEST FOR LOW RISK PATIENT: ICD-10-CM

## 2025-04-08 DIAGNOSIS — Z13.1 SCREENING FOR DIABETES MELLITUS: ICD-10-CM

## 2025-04-08 LAB
BILIRUBIN, URINE, POC: NEGATIVE
BLOOD URINE, POC: NEGATIVE
GLUCOSE URINE, POC: NEGATIVE
KETONES, URINE, POC: NEGATIVE
LEUKOCYTE ESTERASE, URINE, POC: NEGATIVE
NITRITE, URINE, POC: NEGATIVE
PH, URINE, POC: 6 (ref 4.6–8)
PROTEIN,URINE, POC: NEGATIVE
SPECIFIC GRAVITY, URINE, POC: 1 (ref 1–1.03)
URINALYSIS CLARITY, POC: CLEAR
URINALYSIS COLOR, POC: YELLOW
UROBILINOGEN, POC: NORMAL MG/DL

## 2025-04-08 PROCEDURE — 81001 URINALYSIS AUTO W/SCOPE: CPT

## 2025-04-08 PROCEDURE — 87086 URINE CULTURE/COLONY COUNT: CPT

## 2025-04-08 PROCEDURE — 99205 OFFICE O/P NEW HI 60 MIN: CPT

## 2025-04-08 RX ORDER — PHENAZOPYRIDINE HYDROCHLORIDE 100 MG/1
100 TABLET, FILM COATED ORAL 3 TIMES DAILY PRN
Qty: 9 TABLET | Refills: 0 | Status: SHIPPED | OUTPATIENT
Start: 2025-04-08 | End: 2026-04-08

## 2025-04-08 RX ORDER — DEXTROAMPHETAMINE SACCHARATE, AMPHETAMINE ASPARTATE, DEXTROAMPHETAMINE SULFATE AND AMPHETAMINE SULFATE 7.5; 7.5; 7.5; 7.5 MG/1; MG/1; MG/1; MG/1
30 TABLET ORAL 2 TIMES DAILY
COMMUNITY

## 2025-04-08 RX ORDER — LORATADINE 10 MG/1
10 CAPSULE, LIQUID FILLED ORAL DAILY
COMMUNITY

## 2025-04-08 SDOH — ECONOMIC STABILITY: FOOD INSECURITY: WITHIN THE PAST 12 MONTHS, THE FOOD YOU BOUGHT JUST DIDN'T LAST AND YOU DIDN'T HAVE MONEY TO GET MORE.: NEVER TRUE

## 2025-04-08 SDOH — ECONOMIC STABILITY: FOOD INSECURITY: WITHIN THE PAST 12 MONTHS, YOU WORRIED THAT YOUR FOOD WOULD RUN OUT BEFORE YOU GOT MONEY TO BUY MORE.: NEVER TRUE

## 2025-04-08 ASSESSMENT — PATIENT HEALTH QUESTIONNAIRE - PHQ9
5. POOR APPETITE OR OVEREATING: NEARLY EVERY DAY
3. TROUBLE FALLING OR STAYING ASLEEP: NEARLY EVERY DAY
1. LITTLE INTEREST OR PLEASURE IN DOING THINGS: SEVERAL DAYS
SUM OF ALL RESPONSES TO PHQ QUESTIONS 1-9: 12
6. FEELING BAD ABOUT YOURSELF - OR THAT YOU ARE A FAILURE OR HAVE LET YOURSELF OR YOUR FAMILY DOWN: NOT AT ALL
SUM OF ALL RESPONSES TO PHQ QUESTIONS 1-9: 12
2. FEELING DOWN, DEPRESSED OR HOPELESS: SEVERAL DAYS
7. TROUBLE CONCENTRATING ON THINGS, SUCH AS READING THE NEWSPAPER OR WATCHING TELEVISION: SEVERAL DAYS
SUM OF ALL RESPONSES TO PHQ QUESTIONS 1-9: 12
4. FEELING TIRED OR HAVING LITTLE ENERGY: NEARLY EVERY DAY
8. MOVING OR SPEAKING SO SLOWLY THAT OTHER PEOPLE COULD HAVE NOTICED. OR THE OPPOSITE, BEING SO FIGETY OR RESTLESS THAT YOU HAVE BEEN MOVING AROUND A LOT MORE THAN USUAL: NOT AT ALL
SUM OF ALL RESPONSES TO PHQ QUESTIONS 1-9: 12
10. IF YOU CHECKED OFF ANY PROBLEMS, HOW DIFFICULT HAVE THESE PROBLEMS MADE IT FOR YOU TO DO YOUR WORK, TAKE CARE OF THINGS AT HOME, OR GET ALONG WITH OTHER PEOPLE: VERY DIFFICULT
9. THOUGHTS THAT YOU WOULD BE BETTER OFF DEAD, OR OF HURTING YOURSELF: NOT AT ALL

## 2025-04-08 ASSESSMENT — ENCOUNTER SYMPTOMS: SHORTNESS OF BREATH: 0

## 2025-04-08 NOTE — PROGRESS NOTES
\"Have you been to the ER, urgent care clinic since your last visit?  Hospitalized since your last visit?\"    NO    “Have you seen or consulted any other health care providers outside our system since your last visit?”    NO          
the treatment plan as well as documenting on the day of the visit.    YULI Yusuf - NP

## 2025-04-08 NOTE — ASSESSMENT & PLAN NOTE
Pt currently has mental health provider.   Plan: continue follow up with mental health provider as planned/scheduled.

## 2025-04-10 LAB
BACTERIA SPEC CULT: NORMAL
SERVICE CMNT-IMP: NORMAL

## 2025-04-11 ENCOUNTER — HOSPITAL ENCOUNTER (OUTPATIENT)
Facility: HOSPITAL | Age: 55
Setting detail: SPECIMEN
Discharge: HOME | End: 2025-04-14
Payer: COMMERCIAL

## 2025-04-11 DIAGNOSIS — Z11.59 ENCOUNTER FOR HEPATITIS C SCREENING TEST FOR LOW RISK PATIENT: ICD-10-CM

## 2025-04-11 DIAGNOSIS — Z13.220 SCREENING FOR LIPID DISORDERS: ICD-10-CM

## 2025-04-11 DIAGNOSIS — R30.0 DYSURIA: ICD-10-CM

## 2025-04-11 DIAGNOSIS — F33.0 MILD EPISODE OF RECURRENT MAJOR DEPRESSIVE DISORDER: ICD-10-CM

## 2025-04-11 DIAGNOSIS — Z11.4 SCREENING FOR HIV (HUMAN IMMUNODEFICIENCY VIRUS): ICD-10-CM

## 2025-04-11 LAB
ALBUMIN SERPL-MCNC: 4.1 G/DL (ref 3.4–5)
ALBUMIN/GLOB SERPL: 1.2 (ref 0.8–1.7)
ALP SERPL-CCNC: 107 U/L (ref 45–117)
ALT SERPL-CCNC: 34 U/L (ref 13–56)
ANION GAP SERPL CALC-SCNC: 6 MMOL/L (ref 3–18)
AST SERPL-CCNC: 21 U/L (ref 10–38)
BASOPHILS # BLD: 0.05 K/UL (ref 0–0.1)
BASOPHILS NFR BLD: 0.6 % (ref 0–2)
BILIRUB SERPL-MCNC: 0.4 MG/DL (ref 0.2–1)
BUN SERPL-MCNC: 14 MG/DL (ref 7–18)
BUN/CREAT SERPL: 16 (ref 12–20)
CALCIUM SERPL-MCNC: 9.7 MG/DL (ref 8.5–10.1)
CHLORIDE SERPL-SCNC: 108 MMOL/L (ref 100–111)
CHOLEST SERPL-MCNC: 211 MG/DL
CO2 SERPL-SCNC: 25 MMOL/L (ref 21–32)
CREAT SERPL-MCNC: 0.88 MG/DL (ref 0.6–1.3)
DIFFERENTIAL METHOD BLD: ABNORMAL
EOSINOPHIL # BLD: 0.33 K/UL (ref 0–0.4)
EOSINOPHIL NFR BLD: 3.8 % (ref 0–5)
ERYTHROCYTE [DISTWIDTH] IN BLOOD BY AUTOMATED COUNT: 13.2 % (ref 11.6–14.5)
EST. AVERAGE GLUCOSE BLD GHB EST-MCNC: 117 MG/DL
GLOBULIN SER CALC-MCNC: 3.3 G/DL (ref 2–4)
GLUCOSE SERPL-MCNC: 111 MG/DL (ref 74–99)
HBA1C MFR BLD: 5.7 % (ref 4.2–5.6)
HCT VFR BLD AUTO: 45.9 % (ref 35–45)
HDLC SERPL-MCNC: 65 MG/DL (ref 40–60)
HDLC SERPL: 3.2 (ref 0–5)
HGB BLD-MCNC: 14.4 G/DL (ref 12–16)
IMM GRANULOCYTES # BLD AUTO: 0.05 K/UL (ref 0–0.04)
IMM GRANULOCYTES NFR BLD AUTO: 0.6 % (ref 0–0.5)
LDLC SERPL CALC-MCNC: 128.4 MG/DL (ref 0–100)
LIPID PANEL: ABNORMAL
LYMPHOCYTES # BLD: 2.6 K/UL (ref 0.9–3.6)
LYMPHOCYTES NFR BLD: 29.7 % (ref 21–52)
MCH RBC QN AUTO: 29.6 PG (ref 24–34)
MCHC RBC AUTO-ENTMCNC: 31.4 G/DL (ref 31–37)
MCV RBC AUTO: 94.3 FL (ref 78–100)
MONOCYTES # BLD: 0.69 K/UL (ref 0.05–1.2)
MONOCYTES NFR BLD: 7.9 % (ref 3–10)
NEUTS SEG # BLD: 5.04 K/UL (ref 1.8–8)
NEUTS SEG NFR BLD: 57.4 % (ref 40–73)
NRBC # BLD: 0 K/UL (ref 0–0.01)
NRBC BLD-RTO: 0 PER 100 WBC
PLATELET # BLD AUTO: 395 K/UL (ref 135–420)
PMV BLD AUTO: 10.2 FL (ref 9.2–11.8)
POTASSIUM SERPL-SCNC: 4.5 MMOL/L (ref 3.5–5.5)
PROT SERPL-MCNC: 7.4 G/DL (ref 6.4–8.2)
RBC # BLD AUTO: 4.87 M/UL (ref 4.2–5.3)
SODIUM SERPL-SCNC: 139 MMOL/L (ref 136–145)
T4 FREE SERPL-MCNC: 0.9 NG/DL (ref 0.7–1.5)
TRIGL SERPL-MCNC: 88 MG/DL
TSH SERPL DL<=0.05 MIU/L-ACNC: 2.68 UIU/ML (ref 0.36–3.74)
VLDLC SERPL CALC-MCNC: 17.6 MG/DL
WBC # BLD AUTO: 8.8 K/UL (ref 4.6–13.2)

## 2025-04-11 PROCEDURE — 87389 HIV-1 AG W/HIV-1&-2 AB AG IA: CPT

## 2025-04-11 PROCEDURE — 84439 ASSAY OF FREE THYROXINE: CPT

## 2025-04-11 PROCEDURE — 82306 VITAMIN D 25 HYDROXY: CPT

## 2025-04-11 PROCEDURE — 80061 LIPID PANEL: CPT

## 2025-04-11 PROCEDURE — 80053 COMPREHEN METABOLIC PANEL: CPT

## 2025-04-11 PROCEDURE — 84443 ASSAY THYROID STIM HORMONE: CPT

## 2025-04-11 PROCEDURE — 85025 COMPLETE CBC W/AUTO DIFF WBC: CPT

## 2025-04-11 PROCEDURE — 86803 HEPATITIS C AB TEST: CPT

## 2025-04-11 PROCEDURE — 83036 HEMOGLOBIN GLYCOSYLATED A1C: CPT

## 2025-04-11 PROCEDURE — 36415 COLL VENOUS BLD VENIPUNCTURE: CPT

## 2025-04-12 LAB — 25(OH)D3 SERPL-MCNC: 22.1 NG/ML (ref 30–100)

## 2025-04-13 LAB
HCV AB SERPL QL IA: NORMAL
HCV IGG SERPL QL IA: NON REACTIVE S/CO RATIO
HIV 1+2 AB+HIV1 P24 AG SERPL QL IA: NONREACTIVE
HIV 1/2 RESULT COMMENT: NORMAL

## 2025-04-28 ENCOUNTER — HOSPITAL ENCOUNTER (OUTPATIENT)
Facility: HOSPITAL | Age: 55
Discharge: HOME | End: 2025-05-01
Payer: COMMERCIAL

## 2025-04-28 VITALS — HEIGHT: 66 IN | BODY MASS INDEX: 30.53 KG/M2 | WEIGHT: 190 LBS

## 2025-04-28 DIAGNOSIS — Z12.31 SCREENING MAMMOGRAM FOR BREAST CANCER: ICD-10-CM

## 2025-04-28 PROCEDURE — 77063 BREAST TOMOSYNTHESIS BI: CPT

## 2025-05-09 ENCOUNTER — OFFICE VISIT (OUTPATIENT)
Facility: CLINIC | Age: 55
End: 2025-05-09
Payer: COMMERCIAL

## 2025-05-09 VITALS
HEIGHT: 66 IN | BODY MASS INDEX: 30.34 KG/M2 | WEIGHT: 188.8 LBS | RESPIRATION RATE: 18 BRPM | SYSTOLIC BLOOD PRESSURE: 120 MMHG | HEART RATE: 88 BPM | DIASTOLIC BLOOD PRESSURE: 71 MMHG | TEMPERATURE: 98.1 F | OXYGEN SATURATION: 97 %

## 2025-05-09 DIAGNOSIS — R79.89 LOW VITAMIN D LEVEL: ICD-10-CM

## 2025-05-09 DIAGNOSIS — R73.03 PREDIABETES: Primary | ICD-10-CM

## 2025-05-09 DIAGNOSIS — E78.5 HYPERLIPIDEMIA WITH TARGET LDL LESS THAN 100: ICD-10-CM

## 2025-05-09 PROCEDURE — 99213 OFFICE O/P EST LOW 20 MIN: CPT

## 2025-05-09 ASSESSMENT — ANXIETY QUESTIONNAIRES
7. FEELING AFRAID AS IF SOMETHING AWFUL MIGHT HAPPEN: NOT AT ALL
5. BEING SO RESTLESS THAT IT IS HARD TO SIT STILL: NOT AT ALL
IF YOU CHECKED OFF ANY PROBLEMS ON THIS QUESTIONNAIRE, HOW DIFFICULT HAVE THESE PROBLEMS MADE IT FOR YOU TO DO YOUR WORK, TAKE CARE OF THINGS AT HOME, OR GET ALONG WITH OTHER PEOPLE: NOT DIFFICULT AT ALL
GAD7 TOTAL SCORE: 0
6. BECOMING EASILY ANNOYED OR IRRITABLE: NOT AT ALL
1. FEELING NERVOUS, ANXIOUS, OR ON EDGE: NOT AT ALL
3. WORRYING TOO MUCH ABOUT DIFFERENT THINGS: NOT AT ALL
4. TROUBLE RELAXING: NOT AT ALL
2. NOT BEING ABLE TO STOP OR CONTROL WORRYING: NOT AT ALL

## 2025-05-09 ASSESSMENT — ENCOUNTER SYMPTOMS: SHORTNESS OF BREATH: 0

## 2025-05-09 ASSESSMENT — PATIENT HEALTH QUESTIONNAIRE - PHQ9
8. MOVING OR SPEAKING SO SLOWLY THAT OTHER PEOPLE COULD HAVE NOTICED. OR THE OPPOSITE, BEING SO FIGETY OR RESTLESS THAT YOU HAVE BEEN MOVING AROUND A LOT MORE THAN USUAL: NOT AT ALL
9. THOUGHTS THAT YOU WOULD BE BETTER OFF DEAD, OR OF HURTING YOURSELF: NOT AT ALL
SUM OF ALL RESPONSES TO PHQ QUESTIONS 1-9: 0
2. FEELING DOWN, DEPRESSED OR HOPELESS: NOT AT ALL
10. IF YOU CHECKED OFF ANY PROBLEMS, HOW DIFFICULT HAVE THESE PROBLEMS MADE IT FOR YOU TO DO YOUR WORK, TAKE CARE OF THINGS AT HOME, OR GET ALONG WITH OTHER PEOPLE: NOT DIFFICULT AT ALL
SUM OF ALL RESPONSES TO PHQ QUESTIONS 1-9: 0
SUM OF ALL RESPONSES TO PHQ QUESTIONS 1-9: 0
1. LITTLE INTEREST OR PLEASURE IN DOING THINGS: NOT AT ALL
SUM OF ALL RESPONSES TO PHQ QUESTIONS 1-9: 0
3. TROUBLE FALLING OR STAYING ASLEEP: NOT AT ALL
5. POOR APPETITE OR OVEREATING: NOT AT ALL
6. FEELING BAD ABOUT YOURSELF - OR THAT YOU ARE A FAILURE OR HAVE LET YOURSELF OR YOUR FAMILY DOWN: NOT AT ALL
7. TROUBLE CONCENTRATING ON THINGS, SUCH AS READING THE NEWSPAPER OR WATCHING TELEVISION: NOT AT ALL
4. FEELING TIRED OR HAVING LITTLE ENERGY: NOT AT ALL

## 2025-05-09 NOTE — ASSESSMENT & PLAN NOTE
goal is to be under 100.  Discussed importance of making lifestyle changes and increasing physical activity recommended low fat low processed food diet increase fresh fruits vegetables and lean proteins.  Patient states she has started to decrease carbs as well.  Patient appears to be committed to making lifestyle changes.  Plan: Will recheck lipid panel in 3 months.

## 2025-05-09 NOTE — ASSESSMENT & PLAN NOTE
Vitamin D level 22.  Patient denies currently taking any supplements for vitamin D no previous history of low vitamin D level.  Plan: Recommended over-the-counter vitamin D3 600 international units daily.  Will recheck vitamin D in 3 months.

## 2025-05-09 NOTE — PATIENT INSTRUCTIONS
RECOMMEND OVER THE COUNTER VITAMIN D3: 600IU DAILY AND WILL RECHECK YOUR VITAMIN D LEVELS IN 3 MONTHS          Prediabetes: Healthy Changes You Can Make (02:19)  Your health professional recommends that you watch this short online health video.  Learn how to make healthy changes that can help delay or prevent type 2 diabetes.   Purpose: Discusses healthy eating and moving more. Encourages making small changes over time.  Goal: The user will learn how to make healthy changes that can help delay or prevent type 2 diabetes.     Watch: Scan the QR code or visit the link to view video       [email or web address]  Current as of: October 2, 2023  Content Version: 14.1  © 2006-2024 [employer].   Care instructions adapted under license by [employer]. If you have questions about a medical condition or this instruction, always ask your healthcare professional. [employer] disclaims any warranty or liability for your use of this information.

## 2025-05-09 NOTE — PROGRESS NOTES
Chief Complaint   Patient presents with    Discuss Labs    Hyperlipidemia    prediabetes       Assessment & Plan:     1. Prediabetes  Assessment & Plan:   A1c 5.7.  Patient is prediabetes.  Discussed importance of making lifestyle changes to include eating habits and physical activity.  Patient states over the past few weeks has started walking more as she watches her grandson they go for walks while the grandson is in the stroller.  Patient is committed to making lifestyle changes.  Plan: Will recheck A1c in 3 months.  Orders:  -     Hemoglobin A1C; Future  2. Hyperlipidemia with target LDL less than 100  Assessment & Plan:    goal is to be under 100.  Discussed importance of making lifestyle changes and increasing physical activity recommended low fat low processed food diet increase fresh fruits vegetables and lean proteins.  Patient states she has started to decrease carbs as well.  Patient appears to be committed to making lifestyle changes.  Plan: Will recheck lipid panel in 3 months.  Orders:  -     Lipid Panel; Future  3. Low vitamin D level  Assessment & Plan:   Vitamin D level 22.  Patient denies currently taking any supplements for vitamin D no previous history of low vitamin D level.  Plan: Recommended over-the-counter vitamin D3 600 international units daily.  Will recheck vitamin D in 3 months.  Orders:  -     Vitamin D 25 Hydroxy; Future      Follow-up and Dispositions    Return in about 3 months (around 8/9/2025) for Discuss Lab Results, Virtual Visit.         Subjective:     HPI    Prediabetes-  A1C5.7 4/11/25  Diet: eats macaroni and cheese daily but over the past 2-3 weeks has decreased  Exercise: pt reports she is doing more walking   Risk factors:  Treatment:    Jazmin Tristan is a 55 y.o. White (non-) female who was seen in clinic today (5/9/2025) for follow-up of Discuss Labs, Hyperlipidemia, and prediabetes  .      Hyperlipidemia:  LDL: 128.4-4/11/25  Diet and

## 2025-05-09 NOTE — ASSESSMENT & PLAN NOTE
A1c 5.7.  Patient is prediabetes.  Discussed importance of making lifestyle changes to include eating habits and physical activity.  Patient states over the past few weeks has started walking more as she watches her grandson they go for walks while the grandson is in the stroller.  Patient is committed to making lifestyle changes.  Plan: Will recheck A1c in 3 months.